# Patient Record
Sex: FEMALE | Race: WHITE | NOT HISPANIC OR LATINO | Employment: OTHER | URBAN - NONMETROPOLITAN AREA
[De-identification: names, ages, dates, MRNs, and addresses within clinical notes are randomized per-mention and may not be internally consistent; named-entity substitution may affect disease eponyms.]

---

## 2017-04-03 ENCOUNTER — OFFICE VISIT (OUTPATIENT)
Dept: OPHTHALMOLOGY | Facility: CLINIC | Age: 72
End: 2017-04-03

## 2017-04-03 DIAGNOSIS — H52.13 MYOPIA, BILATERAL: ICD-10-CM

## 2017-04-03 DIAGNOSIS — H52.203 ASTIGMATISM, BILATERAL: ICD-10-CM

## 2017-04-03 DIAGNOSIS — H25.013 CORTICAL AGE-RELATED CATARACT, BILATERAL: Primary | ICD-10-CM

## 2017-04-03 PROBLEM — H52.209 ASTIGMATISM: Status: ACTIVE | Noted: 2017-04-03

## 2017-04-03 PROCEDURE — 99213 OFFICE O/P EST LOW 20 MIN: CPT | Performed by: OPHTHALMOLOGY

## 2017-04-03 NOTE — PROGRESS NOTES
Subjective   Laurie Velasquez is a 71 y.o. female.   Chief Complaint   Patient presents with   • Cataract   • Myopia   • neoplasm of choroid left eye     HPI     Cataract   Laterality: both eyes           Comments   Pt not happy with recent glasses based on Rx of 9/2016       Last edited by Nathaniel Marlow MD on 4/3/2017  2:06 PM. (History)          Review of Systems    Objective   Visual Acuity (Snellen - Linear)      Right Left   Dist cc 20/50 20/60 -1   Near cc J2 J1       Correction:  Glasses         Wearing Rx      Sphere Cylinder Axis Add   Right -1.25 +1.50 179 +2.75   Left -1.50 +2.00 168 +2.75           Manifest Refraction      Sphere Cylinder Axis Dist Add   Right -1.25 +1.25 180 20/30- +2.75   Left -1.50 +1.50 180 20/40- +2.75            Final Rx      Sphere Cylinder Axis Add   Right -1.25 +1.25 180 +2.75   Left -1.50 +1.50 180 +2.75             Not recorded               Main Ophthalmology Exam     External Exam      Right Left    External Normal Normal      Slit Lamp Exam      Right Left    Lids/Lashes Normal Normal    Conjunctiva/Sclera White and quiet White and quiet    Cornea Clear Clear    Anterior Chamber Deep and quiet Deep and quiet    Iris Round and reactive Round and reactive    Lens 1+ Cortical cataract 1+ Cortical cataract    Vitreous Normal Asteroid hyalosis                Assessment/Plan   Diagnoses and all orders for this visit:    Cortical age-related cataract, bilateral    Myopia, bilateral    Astigmatism, bilateral    Plan:     Glasses Rx given per refraction    Return if symptoms worsen or fail to improve.

## 2017-05-29 ENCOUNTER — HOSPITAL ENCOUNTER (EMERGENCY)
Facility: HOSPITAL | Age: 72
Discharge: HOME | End: 2017-05-29
Attending: EMERGENCY MEDICINE | Admitting: EMERGENCY MEDICINE

## 2017-05-29 ENCOUNTER — APPOINTMENT (OUTPATIENT)
Dept: GENERAL RADIOLOGY | Facility: HOSPITAL | Age: 72
End: 2017-05-29

## 2017-05-29 ENCOUNTER — APPOINTMENT (OUTPATIENT)
Dept: CT IMAGING | Facility: HOSPITAL | Age: 72
End: 2017-05-29

## 2017-05-29 VITALS
OXYGEN SATURATION: 99 % | SYSTOLIC BLOOD PRESSURE: 171 MMHG | HEART RATE: 73 BPM | BODY MASS INDEX: 21.79 KG/M2 | HEIGHT: 63 IN | TEMPERATURE: 98.3 F | WEIGHT: 123 LBS | DIASTOLIC BLOOD PRESSURE: 79 MMHG | RESPIRATION RATE: 16 BRPM

## 2017-05-29 DIAGNOSIS — S30.0XXA COCCYX CONTUSION, INITIAL ENCOUNTER: ICD-10-CM

## 2017-05-29 DIAGNOSIS — S40.012A CONTUSION OF LEFT SCAPULA, INITIAL ENCOUNTER: ICD-10-CM

## 2017-05-29 DIAGNOSIS — S16.1XXA CERVICAL STRAIN, INITIAL ENCOUNTER: ICD-10-CM

## 2017-05-29 DIAGNOSIS — S00.03XA SCALP CONTUSION: Primary | ICD-10-CM

## 2017-05-29 PROCEDURE — 72170 X-RAY EXAM OF PELVIS: CPT

## 2017-05-29 PROCEDURE — 70450 CT HEAD/BRAIN W/O DYE: CPT

## 2017-05-29 PROCEDURE — 71101 X-RAY EXAM UNILAT RIBS/CHEST: CPT

## 2017-05-29 PROCEDURE — 99283 EMERGENCY DEPT VISIT LOW MDM: CPT

## 2017-05-29 PROCEDURE — 72220 X-RAY EXAM SACRUM TAILBONE: CPT

## 2017-05-29 PROCEDURE — 72125 CT NECK SPINE W/O DYE: CPT

## 2017-05-29 RX ORDER — HYDROCODONE BITARTRATE AND ACETAMINOPHEN 5; 325 MG/1; MG/1
1 TABLET ORAL ONCE
Status: COMPLETED | OUTPATIENT
Start: 2017-05-29 | End: 2017-05-29

## 2017-05-29 RX ORDER — HYDROCODONE BITARTRATE AND ACETAMINOPHEN 5; 325 MG/1; MG/1
1 TABLET ORAL EVERY 6 HOURS PRN
Qty: 15 TABLET | Refills: 0 | Status: SHIPPED | OUTPATIENT
Start: 2017-05-29 | End: 2018-09-27

## 2017-05-29 RX ORDER — CYCLOBENZAPRINE HCL 5 MG
5 TABLET ORAL 3 TIMES DAILY PRN
Qty: 15 TABLET | Refills: 0 | Status: SHIPPED | OUTPATIENT
Start: 2017-05-29 | End: 2018-09-27

## 2017-05-29 RX ADMIN — HYDROCODONE BITARTRATE AND ACETAMINOPHEN 1 TABLET: 5; 325 TABLET ORAL at 18:10

## 2017-10-19 ENCOUNTER — OFFICE VISIT (OUTPATIENT)
Dept: OBSTETRICS AND GYNECOLOGY | Facility: CLINIC | Age: 72
End: 2017-10-19

## 2017-10-19 ENCOUNTER — TELEPHONE (OUTPATIENT)
Dept: OBSTETRICS AND GYNECOLOGY | Facility: CLINIC | Age: 72
End: 2017-10-19

## 2017-10-19 VITALS
HEIGHT: 63 IN | HEART RATE: 70 BPM | BODY MASS INDEX: 22.86 KG/M2 | DIASTOLIC BLOOD PRESSURE: 65 MMHG | WEIGHT: 129 LBS | SYSTOLIC BLOOD PRESSURE: 127 MMHG

## 2017-10-19 DIAGNOSIS — N76.0 ACUTE VULVOVAGINITIS: Primary | ICD-10-CM

## 2017-10-19 LAB
CANDIDA ALBICANS: NEGATIVE
GARDNERELLA VAGINALIS: NEGATIVE
TRICHOMONAS VAGINALIS PCR: NEGATIVE

## 2017-10-19 PROCEDURE — 87480 CANDIDA DNA DIR PROBE: CPT | Performed by: NURSE PRACTITIONER

## 2017-10-19 PROCEDURE — 87660 TRICHOMONAS VAGIN DIR PROBE: CPT | Performed by: NURSE PRACTITIONER

## 2017-10-19 PROCEDURE — 99202 OFFICE O/P NEW SF 15 MIN: CPT | Performed by: NURSE PRACTITIONER

## 2017-10-19 PROCEDURE — 87510 GARDNER VAG DNA DIR PROBE: CPT | Performed by: NURSE PRACTITIONER

## 2017-10-19 RX ORDER — OXYBUTYNIN CHLORIDE 5 MG/1
TABLET, EXTENDED RELEASE ORAL
COMMUNITY
Start: 2017-10-06 | End: 2018-09-27

## 2017-10-19 RX ORDER — CLOTRIMAZOLE 1 %
CREAM (GRAM) TOPICAL 2 TIMES DAILY
Qty: 45 G | Refills: 2 | Status: SHIPPED | OUTPATIENT
Start: 2017-10-19 | End: 2018-09-27

## 2017-10-19 NOTE — TELEPHONE ENCOUNTER
----- Message from LINDA Arndt sent at 10/19/2017  2:23 PM CDT -----  Please let her know that her swab was negative for yeast and BV. Continue vaginal cream and return if no improvement.

## 2017-10-19 NOTE — PROGRESS NOTES
Subjective   Laurie Velasquez is a 72 y.o. female.  here with c/o sorness in the vaginal area x1 month.    Gynecologic Exam   The patient's primary symptoms include genital itching. The patient's pertinent negatives include no genital lesions, genital odor, genital rash, missed menses, pelvic pain, vaginal bleeding or vaginal discharge. Primary symptoms comment: slight pink d/c noted this past .. This is a new problem. The current episode started 1 to 4 weeks ago. The problem occurs daily. The problem has been unchanged. The pain is mild. Pertinent negatives include no abdominal pain, anorexia, chills, dysuria, fever, frequency, hematuria or urgency. Nothing aggravates the symptoms. Treatments tried: monistat cream. The treatment provided no relief. She is not sexually active ( passed away 5 years ago; no sexual activity since then). She uses abstinence for contraception. She is postmenopausal.     LMP- ; FIFI/BSO for uterine fibroids  Last pap-   Last mammogram-     The following portions of the patient's history were reviewed and updated as appropriate: allergies, current medications, past family history, past medical history, past social history, past surgical history and problem list.    Review of Systems   Constitutional: Negative for chills and fever.   Gastrointestinal: Negative for abdominal pain and anorexia.   Genitourinary: Positive for enuresis and vaginal pain. Negative for difficulty urinating, dysuria, frequency, hematuria, missed menses, pelvic pain, urgency, vaginal bleeding and vaginal discharge.        Opening of vagina is sore and feels irritated.       Objective   Physical Exam   Constitutional: She is oriented to person, place, and time. She appears well-developed and well-nourished.   Pulmonary/Chest: Effort normal.   Genitourinary:       No labial fusion. There is no rash, tenderness, lesion or injury on the right labia. There is no rash, tenderness, lesion or injury  on the left labia. There is erythema in the vagina. No tenderness or bleeding in the vagina. No foreign body in the vagina. No signs of injury around the vagina. No vaginal discharge found.   Genitourinary Comments: Uterus, cervix and adnexa surgically absent. Vaginal walls are pale and not well estrogenized. No lesions or suspicious color changes noted. Vaginosis panel obtained.   Lymphadenopathy:        Right: No inguinal adenopathy present.        Left: No inguinal adenopathy present.   Neurological: She is alert and oriented to person, place, and time.   Skin: Skin is warm and dry. No erythema. No pallor.   Psychiatric: She has a normal mood and affect. Her behavior is normal.   Nursing note and vitals reviewed.      Assessment/Plan   Laurie was seen today for gynecologic exam.    Diagnoses and all orders for this visit:    Acute vulvovaginitis  -     Gardnerella vaginalis, Trichomonas vaginalis, Candida albicans, PCR - Swab, Vagina    Other orders  -     clotrimazole (LOTRIMIN) 1 % cream; Apply  topically 2 (Two) Times a Day.

## 2018-05-01 ENCOUNTER — OFFICE VISIT (OUTPATIENT)
Dept: OBSTETRICS AND GYNECOLOGY | Facility: CLINIC | Age: 73
End: 2018-05-01

## 2018-05-01 VITALS
SYSTOLIC BLOOD PRESSURE: 130 MMHG | HEIGHT: 63 IN | HEART RATE: 78 BPM | BODY MASS INDEX: 23.04 KG/M2 | DIASTOLIC BLOOD PRESSURE: 69 MMHG | WEIGHT: 130 LBS

## 2018-05-01 DIAGNOSIS — R58 BLEEDING OF BLOOD VESSEL: Primary | ICD-10-CM

## 2018-05-01 PROCEDURE — 99212 OFFICE O/P EST SF 10 MIN: CPT | Performed by: NURSE PRACTITIONER

## 2018-05-05 NOTE — PROGRESS NOTES
Subjective   Laurie Velasquez is a 72 y.o. female. C/O possible tick bite on labia. Scratched what looked like a scab yesterday and had a lot of bleeding. Has not noticed any bleeding since then and denies pain, tenderness of fever.    Bleeding started yesterday and lasted for about 15 minutes when she was able get it stopped with pressure.      Gynecologic Exam   The patient's primary symptoms include genital lesions. The patient's pertinent negatives include no genital itching, genital odor, genital rash or vaginal bleeding. This is a new problem. The current episode started yesterday. The problem has been resolved. The patient is experiencing no pain. The problem affects the right side. Pertinent negatives include no chills, dysuria or fever.       The following portions of the patient's history were reviewed and updated as appropriate: allergies, current medications, past medical history, past social history and problem list.    Review of Systems   Constitutional: Negative for chills and fever.   Genitourinary: Negative for difficulty urinating, dysuria, genital sores and vaginal bleeding.   Hematological: Does not bruise/bleed easily.       Objective   Physical Exam   Constitutional: She appears well-developed and well-nourished.   Cardiovascular: Normal rate.    Pulmonary/Chest: Effort normal.   Genitourinary:       There is no rash, tenderness, lesion or injury on the right labia. There is no rash, tenderness, lesion or injury on the left labia.   Skin: Skin is warm and dry.   Nursing note and vitals reviewed.      Assessment/Plan   Laurie was seen today for gynecologic exam.    Diagnoses and all orders for this visit:    Bleeding of blood vessel

## 2018-09-27 RX ORDER — LORATADINE 10 MG/1
1 CAPSULE, LIQUID FILLED ORAL DAILY
COMMUNITY

## 2018-10-02 ENCOUNTER — ANESTHESIA (OUTPATIENT)
Dept: GASTROENTEROLOGY | Facility: HOSPITAL | Age: 73
End: 2018-10-02

## 2018-10-02 ENCOUNTER — HOSPITAL ENCOUNTER (OUTPATIENT)
Facility: HOSPITAL | Age: 73
Setting detail: HOSPITAL OUTPATIENT SURGERY
Discharge: HOME | End: 2018-10-02
Attending: INTERNAL MEDICINE | Admitting: INTERNAL MEDICINE

## 2018-10-02 ENCOUNTER — ANESTHESIA EVENT (OUTPATIENT)
Dept: GASTROENTEROLOGY | Facility: HOSPITAL | Age: 73
End: 2018-10-02

## 2018-10-02 VITALS
BODY MASS INDEX: 23.12 KG/M2 | DIASTOLIC BLOOD PRESSURE: 54 MMHG | TEMPERATURE: 97.1 F | HEART RATE: 70 BPM | HEIGHT: 63 IN | RESPIRATION RATE: 16 BRPM | WEIGHT: 130.51 LBS | SYSTOLIC BLOOD PRESSURE: 90 MMHG | OXYGEN SATURATION: 95 %

## 2018-10-02 DIAGNOSIS — Z86.010 HISTORY OF COLONIC POLYPS: ICD-10-CM

## 2018-10-02 PROCEDURE — 25010000002 PROPOFOL 10 MG/ML EMULSION: Performed by: NURSE ANESTHETIST, CERTIFIED REGISTERED

## 2018-10-02 PROCEDURE — 88305 TISSUE EXAM BY PATHOLOGIST: CPT | Performed by: INTERNAL MEDICINE

## 2018-10-02 PROCEDURE — 88305 TISSUE EXAM BY PATHOLOGIST: CPT | Performed by: PATHOLOGY

## 2018-10-02 PROCEDURE — 25010000002 MIDAZOLAM PER 1 MG: Performed by: NURSE ANESTHETIST, CERTIFIED REGISTERED

## 2018-10-02 RX ORDER — PROMETHAZINE HYDROCHLORIDE 25 MG/1
25 SUPPOSITORY RECTAL ONCE AS NEEDED
Status: DISCONTINUED | OUTPATIENT
Start: 2018-10-02 | End: 2018-10-02 | Stop reason: HOSPADM

## 2018-10-02 RX ORDER — DEXTROSE AND SODIUM CHLORIDE 5; .45 G/100ML; G/100ML
20 INJECTION, SOLUTION INTRAVENOUS CONTINUOUS
Status: DISCONTINUED | OUTPATIENT
Start: 2018-10-02 | End: 2018-10-02 | Stop reason: HOSPADM

## 2018-10-02 RX ORDER — DEXAMETHASONE SODIUM PHOSPHATE 4 MG/ML
8 INJECTION, SOLUTION INTRA-ARTICULAR; INTRALESIONAL; INTRAMUSCULAR; INTRAVENOUS; SOFT TISSUE ONCE AS NEEDED
Status: DISCONTINUED | OUTPATIENT
Start: 2018-10-02 | End: 2018-10-02 | Stop reason: HOSPADM

## 2018-10-02 RX ORDER — PROMETHAZINE HYDROCHLORIDE 25 MG/1
25 TABLET ORAL ONCE AS NEEDED
Status: DISCONTINUED | OUTPATIENT
Start: 2018-10-02 | End: 2018-10-02 | Stop reason: HOSPADM

## 2018-10-02 RX ORDER — PROMETHAZINE HYDROCHLORIDE 25 MG/ML
12.5 INJECTION, SOLUTION INTRAMUSCULAR; INTRAVENOUS ONCE AS NEEDED
Status: DISCONTINUED | OUTPATIENT
Start: 2018-10-02 | End: 2018-10-02 | Stop reason: HOSPADM

## 2018-10-02 RX ORDER — ONDANSETRON 2 MG/ML
4 INJECTION INTRAMUSCULAR; INTRAVENOUS ONCE AS NEEDED
Status: DISCONTINUED | OUTPATIENT
Start: 2018-10-02 | End: 2018-10-02 | Stop reason: HOSPADM

## 2018-10-02 RX ORDER — PROPOFOL 10 MG/ML
VIAL (ML) INTRAVENOUS AS NEEDED
Status: DISCONTINUED | OUTPATIENT
Start: 2018-10-02 | End: 2018-10-02 | Stop reason: SURG

## 2018-10-02 RX ORDER — MIDAZOLAM HYDROCHLORIDE 1 MG/ML
INJECTION INTRAMUSCULAR; INTRAVENOUS AS NEEDED
Status: DISCONTINUED | OUTPATIENT
Start: 2018-10-02 | End: 2018-10-02 | Stop reason: SURG

## 2018-10-02 RX ADMIN — PROPOFOL 50 MG: 10 INJECTION, EMULSION INTRAVENOUS at 10:48

## 2018-10-02 RX ADMIN — PROPOFOL 50 MG: 10 INJECTION, EMULSION INTRAVENOUS at 10:45

## 2018-10-02 RX ADMIN — MIDAZOLAM HYDROCHLORIDE 2 MG: 2 INJECTION, SOLUTION INTRAMUSCULAR; INTRAVENOUS at 10:39

## 2018-10-02 RX ADMIN — DEXTROSE AND SODIUM CHLORIDE 20 ML/HR: 5; 450 INJECTION, SOLUTION INTRAVENOUS at 09:00

## 2018-10-02 RX ADMIN — PROPOFOL 50 MG: 10 INJECTION, EMULSION INTRAVENOUS at 10:55

## 2018-10-02 RX ADMIN — PROPOFOL 50 MG: 10 INJECTION, EMULSION INTRAVENOUS at 10:52

## 2018-10-02 NOTE — ANESTHESIA PREPROCEDURE EVALUATION
Anesthesia Evaluation     Patient summary reviewed and Nursing notes reviewed                Airway   Mallampati: II  TM distance: <3 FB  Neck ROM: full  Possible difficult intubation and Small opening  Dental    (+) poor dentition    Pulmonary - negative pulmonary ROS and normal exam   Cardiovascular - normal exam    (+) hyperlipidemia,       Neuro/Psych  (+) psychiatric history Anxiety,     GI/Hepatic/Renal/Endo    (+)  GERD well controlled,      Musculoskeletal (-) negative ROS    Abdominal  - normal exam   Substance History - negative use     OB/GYN negative ob/gyn ROS         Other - negative ROS           Phys Exam Other: Many caries noted, discoloration, patient denies any loose.              Anesthesia Plan    ASA 2     MAC     Anesthetic plan, all risks, benefits, and alternatives have been provided, discussed and informed consent has been obtained with: patient.
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
## 2018-10-02 NOTE — ANESTHESIA POSTPROCEDURE EVALUATION
Patient: Laurie Velasquez    Procedure Summary     Date:  10/02/18 Room / Location:  [facility] ENDOSCOPY 2 / [facility] ENDOSCOPY    Anesthesia Start:  1039 Anesthesia Stop:  1059    Procedure:  COLONOSCOPY (N/A ) Diagnosis:       History of colonic polyps      (History of colonic polyps [Z86.010])    Surgeon:  Fransisco Armstrong DO Provider:  Tatiana Marie CRNA    Anesthesia Type:  MAC ASA Status:  2          Anesthesia Type: MAC  Last vitals  BP   151/77 (10/02/18 0845)   Temp   97 °F (36.1 °C) (10/02/18 0845)   Pulse   61 (10/02/18 0845)   Resp   20 (10/02/18 0845)     SpO2   99 % (10/02/18 0845)     Post Anesthesia Care and Evaluation    Patient location during evaluation: bedside  Patient participation: complete - patient participated  Level of consciousness: awake and awake and alert  Pain management: adequate  Airway patency: patent  Anesthetic complications: No anesthetic complications    Cardiovascular status: acceptable  Respiratory status: acceptable  Hydration status: acceptable

## 2018-10-03 LAB
LAB AP CASE REPORT: NORMAL
PATH REPORT.FINAL DX SPEC: NORMAL
PATH REPORT.GROSS SPEC: NORMAL

## 2020-02-21 ENCOUNTER — TELEPHONE (OUTPATIENT)
Dept: OBSTETRICS AND GYNECOLOGY | Facility: CLINIC | Age: 75
End: 2020-02-21

## 2020-02-21 NOTE — TELEPHONE ENCOUNTER
Patient called and left a vm wanting to know if she needed to have an appointment for Pap and mammogram. Per the last note in the patient appointment 04/30/19 she doesn't need a pap. And her last mammogram was on 04/30/19

## 2020-05-14 DIAGNOSIS — R07.9 CHEST PAIN, UNSPECIFIED TYPE: Primary | ICD-10-CM

## 2020-06-01 ENCOUNTER — HOSPITAL ENCOUNTER (OUTPATIENT)
Dept: NUCLEAR MEDICINE | Facility: HOSPITAL | Age: 75
Discharge: HOME | End: 2020-06-01

## 2020-06-01 ENCOUNTER — HOSPITAL ENCOUNTER (OUTPATIENT)
Dept: CARDIOLOGY | Facility: HOSPITAL | Age: 75
Discharge: HOME | End: 2020-06-01

## 2020-06-01 DIAGNOSIS — R07.9 CHEST PAIN, UNSPECIFIED TYPE: ICD-10-CM

## 2020-06-01 PROCEDURE — 0 TECHNETIUM SESTAMIBI: Performed by: INTERNAL MEDICINE

## 2020-06-01 PROCEDURE — A9500 TC99M SESTAMIBI: HCPCS | Performed by: INTERNAL MEDICINE

## 2020-06-01 PROCEDURE — 93017 CV STRESS TEST TRACING ONLY: CPT

## 2020-06-01 PROCEDURE — 78452 HT MUSCLE IMAGE SPECT MULT: CPT

## 2020-06-01 RX ADMIN — TECHNETIUM TC 99M SESTAMIBI 1 DOSE: 1 INJECTION INTRAVENOUS at 08:24

## 2020-06-01 RX ADMIN — TECHNETIUM TC 99M SESTAMIBI 1 DOSE: 1 INJECTION INTRAVENOUS at 09:43
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
## 2020-06-02 LAB
BH CV STRESS BP STAGE 1: NORMAL
BH CV STRESS BP STAGE 2: NORMAL
BH CV STRESS BP STAGE 3: NORMAL
BH CV STRESS DURATION MIN STAGE 1: 3
BH CV STRESS DURATION MIN STAGE 2: 3
BH CV STRESS DURATION MIN STAGE 3: 1
BH CV STRESS DURATION SEC STAGE 1: 0
BH CV STRESS DURATION SEC STAGE 2: 0
BH CV STRESS DURATION SEC STAGE 3: 33
BH CV STRESS GRADE STAGE 1: 10
BH CV STRESS GRADE STAGE 2: 12
BH CV STRESS GRADE STAGE 3: 14
BH CV STRESS HR STAGE 1: 96
BH CV STRESS HR STAGE 2: 114
BH CV STRESS HR STAGE 3: 129
BH CV STRESS METS STAGE 1: 5
BH CV STRESS METS STAGE 2: 7.5
BH CV STRESS METS STAGE 3: 10
BH CV STRESS PROTOCOL 1: NORMAL
BH CV STRESS RECOVERY BP: NORMAL MMHG
BH CV STRESS RECOVERY HR: 73 BPM
BH CV STRESS SPEED STAGE 1: 1.7
BH CV STRESS SPEED STAGE 2: 2.5
BH CV STRESS SPEED STAGE 3: 3.4
BH CV STRESS STAGE 1: 1
BH CV STRESS STAGE 2: 2
BH CV STRESS STAGE 3: 3
LV EF NUC BP: 84 %
MAXIMAL PREDICTED HEART RATE: 145 BPM
PERCENT MAX PREDICTED HR: 88.97 %
STRESS BASELINE BP: NORMAL MMHG
STRESS BASELINE HR: 63 BPM
STRESS PERCENT HR: 105 %
STRESS POST ESTIMATED WORKLOAD: 10.1 METS
STRESS POST EXERCISE DUR MIN: 7 MIN
STRESS POST EXERCISE DUR SEC: 32 SEC
STRESS POST PEAK BP: NORMAL MMHG
STRESS POST PEAK HR: 129 BPM
STRESS TARGET HR: 123 BPM

## 2021-03-29 ENCOUNTER — OFFICE VISIT (OUTPATIENT)
Dept: OTOLARYNGOLOGY | Facility: CLINIC | Age: 76
End: 2021-03-29

## 2021-03-29 ENCOUNTER — CLINICAL SUPPORT (OUTPATIENT)
Dept: AUDIOLOGY | Facility: CLINIC | Age: 76
End: 2021-03-29

## 2021-03-29 VITALS — WEIGHT: 142.4 LBS | OXYGEN SATURATION: 96 % | HEIGHT: 63 IN | BODY MASS INDEX: 25.23 KG/M2

## 2021-03-29 DIAGNOSIS — H90.3 ASYMMETRIC SNHL (SENSORINEURAL HEARING LOSS): Primary | ICD-10-CM

## 2021-03-29 DIAGNOSIS — H90.3 SENSORINEURAL HEARING LOSS, ASYMMETRICAL: Primary | ICD-10-CM

## 2021-03-29 PROCEDURE — 99203 OFFICE O/P NEW LOW 30 MIN: CPT | Performed by: OTOLARYNGOLOGY

## 2021-03-29 PROCEDURE — 92557 COMPREHENSIVE HEARING TEST: CPT | Performed by: OTOLARYNGOLOGY

## 2021-03-29 PROCEDURE — 92567 TYMPANOMETRY: CPT | Performed by: OTOLARYNGOLOGY

## 2021-03-29 RX ORDER — DIPHENOXYLATE HYDROCHLORIDE AND ATROPINE SULFATE 2.5; .025 MG/1; MG/1
1 TABLET ORAL DAILY
COMMUNITY

## 2021-03-29 RX ORDER — CALCIUM CARBONATE/VITAMIN D3 500-10/5ML
LIQUID (ML) ORAL EVERY 24 HOURS
COMMUNITY

## 2021-03-29 RX ORDER — EZETIMIBE 10 MG/1
10 TABLET ORAL DAILY
COMMUNITY
Start: 2020-12-27 | End: 2023-02-14

## 2021-03-30 NOTE — PROGRESS NOTES
STANDARD AUDIOMETRIC EVALUATION      Name:  Laurie Velasquez  :  1945  Age:  75 y.o.  Date of Evaluation:  3/30/2021      HISTORY    Reason for visit:  Laurie Velasquez is seen today for a hearing test at the request of Dr. Denny Dotson.  Patient reports she is having problems hearing, more in the right ear.  She reports a positive history of ear surgery on her right ear about 20+ years ago.      EVALUATION    See Audiogram    RESULTS        Otoscopy and Tympanometry 226 Hz :  Right Ear:  Otoscopy:  Testing completed after ears were examined by the ENT physician          Tympanometry:  Middle ear function within normal limits    Left Ear:   Otoscopy:  Testing completed after ears were examined by the ENT physician        Tympanometry:  Middle ear function within normal limits    Test technique:  Standard Audiometry     Pure Tone Audiometry:   Patient responded to pure tones at 20-80 dB for 250-8000 Hz in right ear, and at 15-55 dB for 250-8000 Hz in left ear.       Speech Audiometry:        Right Ear:  Speech Reception Threshold (SRT) was obtained at 35 dBHL                 Speech Discrimination scores were 100% in quiet when words were presented at 75 dBHL       Left Ear:  Speech Reception Threshold (SRT) was obtained at 25 dBHL                 Speech Discrimination scores were 100% in quiet when words were presented at 65 dBHL    Reliability:   good    IMPRESSIONS:  1.  Tympanometry results are consistent with Middle ear function within normal limits in both ears.  2.  Pure tone results are consistent with mild to severe sloping sensorineural hearing loss  for right ear, and within normal limits to moderate sloping sensorineural hearing loss in left ear.       RECOMMENDATIONS:  Patient is seeing the Ear Nose and Throat physician immediately following this examination.  It was a pleasure seeing Laurie Velasquez in Audiology today.  We would be happy to do further testing or discuss these test as  necessary.          This document has been electronically signed by Sydney Maya MS CCC-A on March 30, 2021 10:26 CDT       Sydney Maya MS CCC-A  Licensed Audiologist

## 2021-04-01 NOTE — PROGRESS NOTES
Subjective   Laurie Velasquez is a 75 y.o. female.       History of Present Illness   Patient states her hearing seems decreased in the last year.  She has trouble understanding people when they speak.  This is worse with mask wearing during the pandemic.  Has to turn the TV up loudly.  Underwent right ear surgery approximately 20 years ago.  This sounds like a stapedectomy.  No otorrhea.      The following portions of the patient's history were reviewed and updated as appropriate: allergies, current medications, past family history, past medical history, past social history, past surgical history and problem list.     reports that she has never smoked. She has never used smokeless tobacco. She reports current alcohol use. She reports that she does not use drugs.   Patient is not a tobacco user and has not been counseled for use of tobacco products      Review of Systems        Objective   Physical Exam  Ears: External ears no deformity, canals no discharge, tympanic membranes intact clear and mobile bilaterally.  Nares: No discharge or purulence  Oral cavity: No masses or lesions  Neck: No lymphadenopathy.  No thyromegaly.  Trachea and larynx midline.  No masses in the parotid or submandibular glands.    Audiogram is obtained and reviewed and shows a bilateral sensorineural hearing loss that is slightly worse on the right than the left in all frequencies.  Tympanograms are type a bilaterally.  Discrimination scores are 100% bilaterally.      Assessment/Plan   Diagnoses and all orders for this visit:    1. Asymmetric SNHL (sensorineural hearing loss) (Primary)      Plan: Explained to the patient that at this point I saw no hearing loss that would be amenable to surgery or medication.  She should avoid unnecessary exposure to loud noise, use ear protection when unavoidably around loud noise.  She is medically cleared for amplification if she so desires but she does not think she is ready to pursue that at this point.   Advise return in 1 year with another hearing test and call sooner for problems.

## 2022-11-15 ENCOUNTER — APPOINTMENT (OUTPATIENT)
Dept: CT IMAGING | Facility: HOSPITAL | Age: 77
End: 2022-11-15

## 2023-02-14 ENCOUNTER — OFFICE VISIT (OUTPATIENT)
Dept: OTOLARYNGOLOGY | Facility: CLINIC | Age: 78
End: 2023-02-14
Payer: MEDICARE

## 2023-02-14 VITALS — HEIGHT: 63 IN | BODY MASS INDEX: 23.67 KG/M2 | WEIGHT: 133.6 LBS | OXYGEN SATURATION: 99 %

## 2023-02-14 DIAGNOSIS — K21.9 LARYNGOPHARYNGEAL REFLUX (LPR): ICD-10-CM

## 2023-02-14 DIAGNOSIS — J31.0 CHRONIC RHINITIS: ICD-10-CM

## 2023-02-14 DIAGNOSIS — R49.0 DYSPHONIA: Primary | ICD-10-CM

## 2023-02-14 PROCEDURE — 99214 OFFICE O/P EST MOD 30 MIN: CPT | Performed by: OTOLARYNGOLOGY

## 2023-02-14 PROCEDURE — 31575 DIAGNOSTIC LARYNGOSCOPY: CPT | Performed by: OTOLARYNGOLOGY

## 2023-02-14 RX ORDER — AZELASTINE 1 MG/ML
2 SPRAY, METERED NASAL 2 TIMES DAILY
Qty: 30 ML | Refills: 11 | Status: SHIPPED | OUTPATIENT
Start: 2023-02-14

## 2023-02-14 RX ORDER — FAMOTIDINE 40 MG/1
40 TABLET, FILM COATED ORAL NIGHTLY
Qty: 30 TABLET | Refills: 11 | Status: SHIPPED | OUTPATIENT
Start: 2023-02-14

## 2023-02-14 NOTE — PROGRESS NOTES
Subjective   Laurie Velasquez is a 77 y.o. female.       History of Present Illness   Patient have seen in the past with hearing loss now returns with a new problem.  States her voice has been raspy and she will intermittently lose her voice for over a year.  Has clear rhinorrhea and takes Claritin daily for this.  Gets choked when she eats and will sometimes awaken at night choking.  Has intermittent symptoms of acid reflux and when she has that she takes over-the-counter Prilosec for 14 days.      The following portions of the patient's history were reviewed and updated as appropriate: allergies, current medications, past family history, past medical history, past social history, past surgical history and problem list.     reports that she has never smoked. She has never used smokeless tobacco. She reports current alcohol use. She reports that she does not use drugs.   Patient is not a tobacco user and has not been counseled for use of tobacco products      Review of Systems        Objective   Physical Exam  General: Female no acute distress.  Voice is mildly harsh with some cracks and breaks  Ears no discharge  Nares: Boggy mucosa septum to the left anteriorly.  External scarring consistent with recent surgery for skin cancer, reported by the patient  Oral cavity: Lips and gums without lesions.  Tongue and floor of mouth without lesions.  Parotid and submandibular ducts unobstructed.  No mucosal lesions on the buccal mucosa or vestibule of the mouth.  Pharynx: No erythema exudate mass or ulcer  Neck: No lymphadenopathy.  No thyromegaly.  Trachea and larynx midline.  No masses in the parotid or submandibular glands.  Procedure Note    Pre-operative Diagnosis: Patient presents with:  Hoarse      Post-operative Diagnosis: Chronic laryngitis/laryngopharyngeal reflux    Anesthesia: Topical with Xylocaine and Alexander-Synephrine    Endoscopy Type:  Flexible Laryngoscopy    Procedure Details:    The patient was placed in the  sitting position.  After topical anesthesia and decongestion, the 4 mm laryngoscope was passed.  The nasal cavities, nasopharynx, oropharynx, hypopharynx, and larynx were all examined.  Vocal cords were examined during respiration and phonation.  The following findings were noted:    Findings: Previously noted nasal findings were confirmed. Nasopharynx without mass, hypopharynx and larynx without evidence of neoplasm. Vocal cord mobility intact. There is chronic appearing edema and erythema of the laryngeal structures consistent with chronic laryngitis.    Condition:  Stable.  Patient tolerated procedure well.    Complications:  None         Assessment and Plan   Diagnoses and all orders for this visit:    1. Dysphonia (Primary)    2. Chronic rhinitis    3. Laryngopharyngeal reflux (LPR)    Other orders  -     azelastine (ASTELIN) 0.1 % nasal spray; 2 sprays into the nostril(s) as directed by provider 2 (Two) Times a Day. Use in each nostril as directed  Dispense: 30 mL; Refill: 11  -     famotidine (PEPCID) 40 MG tablet; Take 1 tablet by mouth Every Night.  Dispense: 30 tablet; Refill: 11               Plan: Explained findings to the patient.  I suspect she is having ongoing difficulty with laryngopharyngeal reflux.  Also suspect the Claritin is drying and contributing to her symptoms.  Changed to Astelin 2 sprays each nostril twice a day as needed.  Recommended famotidine to be used every night along with dietary precautions.  Reevaluate in 2 months, sooner for problems.

## 2023-04-18 ENCOUNTER — OFFICE VISIT (OUTPATIENT)
Dept: OTOLARYNGOLOGY | Facility: CLINIC | Age: 78
End: 2023-04-18
Payer: MEDICARE

## 2023-04-18 VITALS — WEIGHT: 132.2 LBS | TEMPERATURE: 97.1 F | HEIGHT: 63 IN | BODY MASS INDEX: 23.42 KG/M2

## 2023-04-18 DIAGNOSIS — J31.0 CHRONIC RHINITIS: Primary | ICD-10-CM

## 2023-04-18 DIAGNOSIS — K21.9 LARYNGOPHARYNGEAL REFLUX (LPR): ICD-10-CM

## 2023-04-18 PROCEDURE — 1160F RVW MEDS BY RX/DR IN RCRD: CPT | Performed by: OTOLARYNGOLOGY

## 2023-04-18 PROCEDURE — 99213 OFFICE O/P EST LOW 20 MIN: CPT | Performed by: OTOLARYNGOLOGY

## 2023-04-18 PROCEDURE — 1159F MED LIST DOCD IN RCRD: CPT | Performed by: OTOLARYNGOLOGY

## 2023-04-18 NOTE — PROGRESS NOTES
Subjective   Laurie Velasquez is a 77 y.o. female.       History of Present Illness   Patient was seen previously with dysphonia as well as symptoms of nocturnal reflux.  She also has chronic rhinitis.  Was given azelastine and famotidine.  Had to purchase over-the-counter azelastine due to cost.  Thinks her throat is doing better including her voice.  The famotidine has led to resolution of throat symptoms.  Says she has had a little bit of flareup in her nasal symptoms and was given some prednisone by Dr. Rosales.  Feels like she is having thick drainage.  She is gone back to taking some Claritin while this flareup has been going on.      The following portions of the patient's history were reviewed and updated as appropriate: allergies, current medications, past family history, past medical history, past social history, past surgical history and problem list.     reports that she has never smoked. She has never used smokeless tobacco. She reports current alcohol use. She reports that she does not use drugs.   Patient is not a tobacco user and has not been counseled for use of tobacco products      Review of Systems        Objective   Physical Exam  Ears: External ears no deformity, canals no discharge, tympanic membranes intact clear and mobile bilaterally.  Nares boggy mucosa septum to the left no discharge or purulence  Oral cavity no masses or lesions  Pharynx no erythema or exudate  Neck no adenopathy or mass         Assessment and Plan   Diagnoses and all orders for this visit:    1. Chronic rhinitis (Primary)    2. Laryngopharyngeal reflux (LPR)             Plan: Continue famotidine.  Told her once again that the Claritin is likely thickening her drainage and drying out her throat.  Advised as long as she is not having watery drainage sneezing or itching she should not take the Claritin but instead use nasal saline spray.  We discussed using Mucinex but guaifenesin products give her diarrhea so this will be  avoided as well.  Return in 6 months.  Call for problems.

## 2023-05-11 ENCOUNTER — OFFICE VISIT (OUTPATIENT)
Dept: OBSTETRICS AND GYNECOLOGY | Facility: CLINIC | Age: 78
End: 2023-05-11
Payer: MEDICARE

## 2023-05-11 VITALS
BODY MASS INDEX: 23.21 KG/M2 | HEIGHT: 63 IN | SYSTOLIC BLOOD PRESSURE: 134 MMHG | WEIGHT: 131 LBS | DIASTOLIC BLOOD PRESSURE: 68 MMHG

## 2023-05-11 DIAGNOSIS — N39.41 URGE INCONTINENCE: ICD-10-CM

## 2023-05-11 DIAGNOSIS — R10.2 VAGINAL PAIN: ICD-10-CM

## 2023-05-11 DIAGNOSIS — N95.2 VAGINAL ATROPHY: Primary | ICD-10-CM

## 2023-05-11 RX ORDER — CONJUGATED ESTROGENS 0.62 MG/G
CREAM VAGINAL
Qty: 30 G | Refills: 6 | Status: SHIPPED | OUTPATIENT
Start: 2023-05-11 | End: 2023-05-15

## 2023-05-11 NOTE — PROGRESS NOTES
Subjective   Laurie Velasquez is a 77 y.o. Vaginal itching and pain     History of Present Illness  Hysterectomy  Pap  Mammo: 7/15/22, Bi-rads 2    Patient presents today for vaginal itching and pain. Reports this occurs about every 6 months or so. She used Monistat and that helped the symptoms to go away. She denies any itching or pain today. Reports urge incontinence.   Gynecologic Exam  The patient's primary symptoms include genital itching. The patient's pertinent negatives include no genital lesions, genital odor, genital rash, missed menses, pelvic pain, vaginal bleeding or vaginal discharge. This is a recurrent problem. The current episode started in the past 7 days. The problem occurs intermittently. The problem has been waxing and waning. The pain is mild. She is not pregnant. Pertinent negatives include no abdominal pain, chills, constipation, diarrhea, dysuria, fever, flank pain, frequency, hematuria, nausea or urgency. Nothing aggravates the symptoms. Treatments tried: Monistat. The treatment provided significant relief. She is not sexually active. She uses hysterectomy for contraception. She is postmenopausal. There is no history of an abdominal surgery, a  section, a gynecological surgery or ovarian cysts.       The following portions of the patient's history were reviewed and updated as appropriate: allergies, current medications, past family history, past medical history, past social history, past surgical history and problem list.    Review of Systems   Constitutional: Negative for appetite change, chills, fatigue and fever.   Respiratory: Negative for apnea, cough, choking, chest tightness, shortness of breath, wheezing and stridor.    Cardiovascular: Negative for chest pain, palpitations and leg swelling.   Gastrointestinal: Negative for abdominal pain, constipation, diarrhea and nausea.   Genitourinary: Positive for urinary incontinence and vaginal pain. Negative for amenorrhea, breast  discharge, breast lump, breast pain, decreased libido, decreased urine volume, difficulty urinating, dyspareunia, dysuria, flank pain, frequency, genital sores, hematuria, menstrual problem, missed menses, pelvic pain, pelvic pressure, urgency, vaginal bleeding and vaginal discharge.       Objective   Physical Exam  Vitals reviewed. Exam conducted with a chaperone present.   Constitutional:       General: She is awake. She is not in acute distress.     Appearance: Normal appearance. She is well-developed and normal weight. She is not ill-appearing, toxic-appearing or diaphoretic.   Cardiovascular:      Rate and Rhythm: Normal rate and regular rhythm.      Pulses: Normal pulses.      Heart sounds: Normal heart sounds.   Pulmonary:      Effort: Pulmonary effort is normal.      Breath sounds: Normal breath sounds.   Abdominal:      General: Bowel sounds are normal.      Hernia: There is no hernia in the left inguinal area or right inguinal area.   Genitourinary:     General: Normal vulva.      Exam position: Lithotomy position.      Pubic Area: No rash or pubic lice.       Eliezer stage (genital): 5.      Labia:         Right: No rash, tenderness, lesion or injury.         Left: No rash, tenderness, lesion or injury.       Vagina: No signs of injury and foreign body. Erythema (Petechiae ) and tenderness present. No vaginal discharge, bleeding, lesions or prolapsed vaginal walls.      Comments: Cervix surgically absent  Vaginal atrophy noted  Lymphadenopathy:      Lower Body: No right inguinal adenopathy. No left inguinal adenopathy.   Skin:     General: Skin is warm and dry.   Neurological:      Mental Status: She is alert and easily aroused.   Psychiatric:         Behavior: Behavior is cooperative.           Assessment & Plan   Diagnoses and all orders for this visit:    1. Vaginal atrophy (Primary)  -     Estrogens Conjugated (Premarin) 0.625 MG/GM vaginal cream; Insert 0.5 grams vaginally at bedtime 2 nights per  week.  Dispense: 30 g; Refill: 6    2. Vaginal pain  -     Estrogens Conjugated (Premarin) 0.625 MG/GM vaginal cream; Insert 0.5 grams vaginally at bedtime 2 nights per week.  Dispense: 30 g; Refill: 6    3. Urge incontinence    Recommend using Premarin cream 2 times per week. Will take about 6 weeks to feel improvement.   If severe itching returns, you may use OTC Monistat, if that does not help RTC.   Offered PFPT and pt declined at this time. Use of Premarin can possibly help with this issue also.         This document has been electronically signed by LINDA Mckeon on May 11, 2023 13:27 CDT

## 2023-05-12 ENCOUNTER — TELEPHONE (OUTPATIENT)
Dept: OBSTETRICS AND GYNECOLOGY | Facility: CLINIC | Age: 78
End: 2023-05-12
Payer: MEDICARE

## 2023-05-12 NOTE — TELEPHONE ENCOUNTER
----- Message from Michelle Panda sent at 5/11/2023  3:51 PM CDT -----  Patient says the premarin prescription cost $400 and she can not afford that   She is wanting something else called in to the pharmacy

## 2023-05-15 ENCOUNTER — TELEPHONE (OUTPATIENT)
Dept: OBSTETRICS AND GYNECOLOGY | Facility: CLINIC | Age: 78
End: 2023-05-15

## 2023-05-15 RX ORDER — ESTRADIOL 0.1 MG/G
CREAM VAGINAL
Qty: 42.5 G | Refills: 3 | Status: SHIPPED | OUTPATIENT
Start: 2023-05-15

## 2023-05-15 NOTE — TELEPHONE ENCOUNTER
PATIENT CALLED AND THE MEDICATION Estrogens Conjugated (Premarin) 0.625 MG/GM vaginal cream    IS TOO EXPENSIVE AND IS NEEDING A CALL BACK -[phone].          THANKS,      LAY

## 2023-09-26 ENCOUNTER — PREP FOR SURGERY (OUTPATIENT)
Dept: OTHER | Facility: HOSPITAL | Age: 78
End: 2023-09-26
Payer: MEDICARE

## 2023-09-26 DIAGNOSIS — K22.2 STRICTURE AND STENOSIS OF ESOPHAGUS: Primary | ICD-10-CM

## 2023-09-26 RX ORDER — DEXTROSE AND SODIUM CHLORIDE 5; .45 G/100ML; G/100ML
30 INJECTION, SOLUTION INTRAVENOUS CONTINUOUS PRN
OUTPATIENT
Start: 2023-09-26

## 2023-09-27 PROBLEM — K22.2 STRICTURE AND STENOSIS OF ESOPHAGUS: Status: ACTIVE | Noted: 2023-09-27

## (undated) DEVICE — CANN SMPL SOFTECH BIFLO ETCO2 A/M 7FT

## (undated) DEVICE — SINGLE-USE BIOPSY FORCEPS: Brand: RADIAL JAW 4